# Patient Record
Sex: MALE
[De-identification: names, ages, dates, MRNs, and addresses within clinical notes are randomized per-mention and may not be internally consistent; named-entity substitution may affect disease eponyms.]

---

## 2023-09-20 ENCOUNTER — NURSE TRIAGE (OUTPATIENT)
Dept: OTHER | Facility: CLINIC | Age: 63
End: 2023-09-20

## 2023-09-20 NOTE — TELEPHONE ENCOUNTER
Location of patient: [place]    Subjective: Caller states \"Right foot sweating, numbness, tingling\"     Current Symptoms: Sweating of right foot only, tingling in right foot only, foot feels cool, dizziness    Onset: 5 days     Pain Severity: chronic back pain 8/10 constant    Temperature: Denies    What has been tried: nothing      LMP: NA Pregnant: NA    Recommended disposition: Go to ED Now    Care advice provided, patient verbalizes understanding; denies any other questions or concerns; instructed to call back for any new or worsening symptoms. Patient/caller agrees to proceed to [facility] Emergency Department    This triage is a result of a call to [address] [place] [address] of [employer]. Please do not respond to the triage nurse through this encounter. Any subsequent communication should be directly with the patient.     Reason for Disposition   [1] Numbness (i.e., loss of sensation) of the face, arm / hand, or leg / foot on one side of the body AND [2] sudden onset AND [3] brief (now gone)    Protocols used: Neurologic Deficit-ADULT-